# Patient Record
Sex: MALE | URBAN - METROPOLITAN AREA
[De-identification: names, ages, dates, MRNs, and addresses within clinical notes are randomized per-mention and may not be internally consistent; named-entity substitution may affect disease eponyms.]

---

## 2017-06-07 ENCOUNTER — TRANSFERRED RECORDS (OUTPATIENT)
Dept: HEALTH INFORMATION MANAGEMENT | Facility: CLINIC | Age: 57
End: 2017-06-07

## 2017-06-25 ENCOUNTER — TRANSFERRED RECORDS (OUTPATIENT)
Dept: HEALTH INFORMATION MANAGEMENT | Facility: CLINIC | Age: 57
End: 2017-06-25

## 2017-06-26 ENCOUNTER — MEDICAL CORRESPONDENCE (OUTPATIENT)
Dept: HEALTH INFORMATION MANAGEMENT | Facility: CLINIC | Age: 57
End: 2017-06-26

## 2017-06-29 ENCOUNTER — MEDICAL CORRESPONDENCE (OUTPATIENT)
Dept: HEALTH INFORMATION MANAGEMENT | Facility: CLINIC | Age: 57
End: 2017-06-29

## 2017-07-13 ENCOUNTER — MEDICAL CORRESPONDENCE (OUTPATIENT)
Dept: HEALTH INFORMATION MANAGEMENT | Facility: CLINIC | Age: 57
End: 2017-07-13

## 2017-10-03 ENCOUNTER — OFFICE VISIT (OUTPATIENT)
Dept: NEUROSURGERY | Facility: CLINIC | Age: 57
End: 2017-10-03

## 2017-10-03 VITALS — DIASTOLIC BLOOD PRESSURE: 104 MMHG | HEART RATE: 86 BPM | HEIGHT: 74 IN | SYSTOLIC BLOOD PRESSURE: 161 MMHG

## 2017-10-03 DIAGNOSIS — I67.1 CEREBRAL ANEURYSM, NONRUPTURED: Primary | ICD-10-CM

## 2017-10-03 ASSESSMENT — ENCOUNTER SYMPTOMS
MEMORY LOSS: 1
INCREASED ENERGY: 0
LEG PAIN: 1
MYALGIAS: 0
NIGHT SWEATS: 0
SYNCOPE: 0
MUSCLE CRAMPS: 0
SINUS CONGESTION: 0
PANIC: 0
TACHYCARDIA: 0
HALLUCINATIONS: 0
INSOMNIA: 1
EXERCISE INTOLERANCE: 0
FEVER: 0
HYPOTENSION: 0
DEPRESSION: 1
TREMORS: 0
SINUS PAIN: 0
TASTE DISTURBANCE: 0
SORE THROAT: 0
HOARSE VOICE: 0
POLYPHAGIA: 0
HYPERTENSION: 1
TINGLING: 0
STIFFNESS: 0
DIZZINESS: 0
ORTHOPNEA: 0
JOINT SWELLING: 0
SMELL DISTURBANCE: 0
PALPITATIONS: 0
DECREASED APPETITE: 0
NECK PAIN: 0
POLYDIPSIA: 0
TROUBLE SWALLOWING: 0
HEADACHES: 1
WEAKNESS: 0
BACK PAIN: 1
NECK MASS: 0
LIGHT-HEADEDNESS: 0
WEIGHT GAIN: 0
CLAUDICATION: 0
NUMBNESS: 1
CHILLS: 0
LEG SWELLING: 0
WEIGHT LOSS: 0
PARALYSIS: 0
DISTURBANCES IN COORDINATION: 0
ALTERED TEMPERATURE REGULATION: 0
SPEECH CHANGE: 0
SLEEP DISTURBANCES DUE TO BREATHING: 0
DECREASED CONCENTRATION: 1
LOSS OF CONSCIOUSNESS: 0
SEIZURES: 0
ARTHRALGIAS: 0
FATIGUE: 0
MUSCLE WEAKNESS: 0
NERVOUS/ANXIOUS: 1

## 2017-10-03 NOTE — PROGRESS NOTES
Dear Dr. Spivey:    We saw Mr. Pino Duenas back [facility] for follow-up of his incidental left cavernous carotid aneurysm, discovered during a workup for syncope. Of note, he also has a pineal gland cyst. Since we last saw him, he has continued to have syncopal events, twice in the past year. Leading up to these events, he starts sneezing and coughing extensively. He then is unconscious for less than a minute. He remembers the event. He was worked up at the [facility] for seizures or dysrhythmias. This testing was apparently normal, though we do not have records. He denies new or severe headaches. He believes his vision is declining, worse in his left eye, but he denies double vision. He continues to smoke a pack a day with occasional streaks of trying to quit..     On exam, his general appearance is good. Extraocular movements intact. He moves all extremities equally and with good coordination. Gross neurological examination is normal.    REVIEW OF STUDIES: We reviewed his MRA from June. There has been no change in his aneurysm. 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     IMPRESSION AND PLAN: He has been stable clinically and radiographically. We will see him back in 2 years following an MRA to be done at the [facility]. The etiology of his syncope is unclear, though its association with heavy coughing suggests that these are vasovagal episodes. Please do not hesitate to contact us with questions. We will keep you informed of his progress.    MD JOE BROOKS, Сергей Ferrer, am serving as a scribe to document services personally performed by Rad Garcias MD, based upon my observations and the provider's statements to me. All documentation has been reviewed by  the aforementioned doctor prior to being entered into the official medical record.    I, Rad Garcias, attest that above named individual is acting in scribe capacity, has observed my performance of the services and has documented them in accordance with my direction. The documentation recorded by the scribe accurately reflects the service I personally performed and the decisions made by me.

## 2017-10-03 NOTE — LETTER
10/3/2017       RE: Pino Duenas  [address]  [place] [address]     Dear Colleague,    Thank you for referring your patient, Pino Duenas, to the [facility] NEUROSURGERY at [facility]. Please see a copy of my visit note below.    Dear Dr. Spivey:    We saw Mr. Pino Duenas back [facility] for follow-up of his incidental left cavernous carotid aneurysm, discovered during a workup for syncope. Of note, he also has a pineal gland cyst. Since we last saw him, he has continued to have syncopal events, twice in the past year. Leading up to these events, he starts sneezing and coughing extensively. He then is unconscious for less than a minute. He remembers the event. He was worked up at the [facility] for seizures or dysrhythmias. This testing was apparently normal, though we do not have records. He denies new or severe headaches. He believes his vision is declining, worse in his left eye, but he denies double vision. He continues to smoke a pack a day with occasional streaks of trying to quit..     On exam, his general appearance is good. Extraocular movements intact. He moves all extremities equally and with good coordination. Gross neurological examination is normal.    REVIEW OF STUDIES: We reviewed his MRA from June. There has been no change in his aneurysm. 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     IMPRESSION AND PLAN: He has been stable clinically and radiographically. We will see him back in 2 years following an MRA to be done at the [facility]. The etiology of his syncope is unclear, though its association with heavy coughing suggests that these are vasovagal episodes. Please do not hesitate to contact us with questions.  We will keep you informed of his progress.    TANISHA HURTADO MD    I, Сергей Ferrer, am serving as a scribe to document services personally performed by Tanisha Hurtado MD, based upon my observations and the provider's statements to me. All documentation has been reviewed by the aforementioned doctor prior to being entered into the official medical record.    I, Tanisha Hurtaod, attest that above named individual is acting in scribe capacity, has observed my performance of the services and has documented them in accordance with my direction. The documentation recorded by the scribe accurately reflects the service I personally performed and the decisions made by me.    Again, thank you for allowing me to participate in the care of your patient.      Sincerely,    Tanisha Hurtado MD

## 2017-10-03 NOTE — MR AVS SNAPSHOT
"              After Visit Summary   10/3/2017    Pino Duenas    MRN: [id number]           Patient Information     Date Of Birth          1960        Visit Information        Provider Department      10/3/2017 9:30 AM Rad Garcias MD [facility] Neurosurgery        Today's Diagnoses     Cerebral aneurysm, nonruptured    -  1       Follow-ups after your visit        Who to contact     Please call your clinic at [phone] to:    Ask questions about your health    Make or cancel appointments    Discuss your medicines    Learn about your test results    Speak to your doctor   If you have compliments or concerns about an experience at your clinic, or if you wish to file a complaint, please contact [facility] Patient Relations at [phone] or email us at [email or web address]         Additional Information About Your Visit        MyChart Information     Stiont is an electronic gateway that provides easy, online access to your medical records. With neoSaej, you can request a clinic appointment, read your test results, renew a prescription or communicate with your care team.     To sign up for Stiont visit the website at [email or web address]   You will be asked to enter the access code listed below, as well as some personal information. Please follow the directions to create your username and password.     Your access code is: [email or web address]  Expires: 2017  6:30 AM     Your access code will  in 90 days. If you need help or a new code, please contact your [facility] or call [phone] for assistance.        Care EveryWhere ID     This is your Care EveryWhere ID. This could be used by other organizations to access your [place] medical records  [id number]        Your Vitals Were     Pulse Height                86 1.88 m (6' 2\")           Blood Pressure from Last 3 Encounters:   10/03/17 (!) 161/104 "   09/30/14 132/90   08/08/13 134/87    Weight from Last 3 Encounters:   09/30/14 96.6 kg (213 lb)   08/08/13 99.8 kg (220 lb 1.6 oz)              Today, you had the following     No orders found for display       Primary Care Provider Office Phone # Fax #    Paolonabil Spivey [phone]       [facility] [address]  [facility] [address]        Equal Access to Services     CARMITA RESTREPO : Hadii aad ku hadasho Soomaali, waaxda luqadaha, qaybta kaalmada adeegyada, waxay idiin hayaan dave horan . So [facility] [phone].    ATENCIÓN: Si habla español, tiene a logan disposición servicios gratuitos de asistencia lingüística. Darcyame al [phone].    We comply with applicable federal civil rights laws and [place] laws. We do not discriminate on the basis of race, color, national origin, age, disability, sex, sexual orientation, or gender identity.            Thank you!     Thank you for choosing [facility]  for your care. Our goal is always to provide you with excellent care. Hearing back from our patients is one way we can continue to improve our services. Please take a few minutes to complete the written survey that you may receive in the mail after your visit with us. Thank you!             Your Updated Medication List - Protect others around you: Learn how to safely use, store and throw away your medicines at www.disposemymeds.org.          This list is accurate as of: 10/3/17 11:59 PM.  Always use your most recent med list.                   Brand Name Dispense Instructions for use Diagnosis    ATORVASTATIN CALCIUM PO           ESCITALOPRAM OXALATE PO           LISINOPRIL PO      Take 10 mg by mouth daily

## 2019-09-26 ENCOUNTER — DOCUMENTATION ONLY (OUTPATIENT)
Dept: CARE COORDINATION | Facility: CLINIC | Age: 59
End: 2019-09-26
